# Patient Record
Sex: MALE | Race: WHITE | NOT HISPANIC OR LATINO
[De-identification: names, ages, dates, MRNs, and addresses within clinical notes are randomized per-mention and may not be internally consistent; named-entity substitution may affect disease eponyms.]

---

## 2017-10-11 PROBLEM — Z00.00 ENCOUNTER FOR PREVENTIVE HEALTH EXAMINATION: Status: ACTIVE | Noted: 2017-10-11

## 2017-11-03 ENCOUNTER — APPOINTMENT (OUTPATIENT)
Dept: UROLOGY | Facility: CLINIC | Age: 47
End: 2017-11-03
Payer: MEDICARE

## 2017-11-03 PROCEDURE — 51702 INSERT TEMP BLADDER CATH: CPT

## 2017-11-03 RX ORDER — SERTRALINE HYDROCHLORIDE 50 MG/1
50 TABLET, FILM COATED ORAL
Qty: 30 | Refills: 0 | Status: ACTIVE | COMMUNITY
Start: 2017-07-02

## 2017-11-03 RX ORDER — BETAMETHASONE VALERATE 1 MG/G
0.1 CREAM TOPICAL
Qty: 45 | Refills: 0 | Status: ACTIVE | COMMUNITY
Start: 2017-10-30

## 2017-11-03 RX ORDER — WARFARIN 3 MG/1
3 TABLET ORAL
Qty: 30 | Refills: 0 | Status: ACTIVE | COMMUNITY
Start: 2017-07-14

## 2017-11-03 RX ORDER — WARFARIN 4 MG/1
4 TABLET ORAL
Qty: 30 | Refills: 0 | Status: ACTIVE | COMMUNITY
Start: 2017-07-31

## 2017-11-03 RX ORDER — CLOTRIMAZOLE 10 MG/G
1 CREAM TOPICAL
Qty: 30 | Refills: 0 | Status: ACTIVE | COMMUNITY
Start: 2017-10-30

## 2017-11-03 RX ORDER — CYCLOBENZAPRINE HYDROCHLORIDE 10 MG/1
10 TABLET, FILM COATED ORAL
Qty: 20 | Refills: 0 | Status: ACTIVE | COMMUNITY
Start: 2017-10-25

## 2017-11-03 RX ORDER — MUPIROCIN 20 MG/G
2 OINTMENT TOPICAL
Qty: 22 | Refills: 0 | Status: ACTIVE | COMMUNITY
Start: 2017-10-30

## 2017-12-01 ENCOUNTER — APPOINTMENT (OUTPATIENT)
Dept: UROLOGY | Facility: CLINIC | Age: 47
End: 2017-12-01
Payer: MEDICARE

## 2017-12-01 PROCEDURE — 51702 INSERT TEMP BLADDER CATH: CPT

## 2018-01-05 ENCOUNTER — APPOINTMENT (OUTPATIENT)
Dept: UROLOGY | Facility: CLINIC | Age: 48
End: 2018-01-05

## 2018-01-05 DIAGNOSIS — Z78.9 OTHER SPECIFIED HEALTH STATUS: ICD-10-CM

## 2018-01-05 DIAGNOSIS — Z86.69 PERSONAL HISTORY OF OTHER DISEASES OF THE NERVOUS SYSTEM AND SENSE ORGANS: ICD-10-CM

## 2018-01-19 ENCOUNTER — APPOINTMENT (OUTPATIENT)
Dept: UROLOGY | Facility: CLINIC | Age: 48
End: 2018-01-19
Payer: MEDICARE

## 2018-01-19 VITALS
HEART RATE: 60 BPM | BODY MASS INDEX: 44.1 KG/M2 | SYSTOLIC BLOOD PRESSURE: 154 MMHG | DIASTOLIC BLOOD PRESSURE: 96 MMHG | WEIGHT: 315 LBS | TEMPERATURE: 97.8 F | OXYGEN SATURATION: 90 % | HEIGHT: 71 IN

## 2018-01-19 PROCEDURE — 51702 INSERT TEMP BLADDER CATH: CPT

## 2018-01-19 RX ORDER — SULFAMETHOXAZOLE AND TRIMETHOPRIM 800; 160 MG/1; MG/1
800-160 TABLET ORAL
Qty: 20 | Refills: 0 | Status: DISCONTINUED | COMMUNITY
Start: 2017-10-16 | End: 2018-01-19

## 2018-01-19 RX ORDER — CEFPODOXIME PROXETIL 100 MG/1
100 TABLET, FILM COATED ORAL
Qty: 20 | Refills: 0 | Status: DISCONTINUED | COMMUNITY
Start: 2017-08-18 | End: 2018-01-19

## 2018-02-16 ENCOUNTER — APPOINTMENT (OUTPATIENT)
Dept: UROLOGY | Facility: CLINIC | Age: 48
End: 2018-02-16
Payer: MEDICARE

## 2018-02-16 VITALS
HEART RATE: 78 BPM | WEIGHT: 315 LBS | SYSTOLIC BLOOD PRESSURE: 158 MMHG | TEMPERATURE: 99.1 F | OXYGEN SATURATION: 98 % | DIASTOLIC BLOOD PRESSURE: 114 MMHG | HEIGHT: 71 IN | BODY MASS INDEX: 44.1 KG/M2

## 2018-02-16 PROCEDURE — 51702 INSERT TEMP BLADDER CATH: CPT

## 2018-03-16 ENCOUNTER — APPOINTMENT (OUTPATIENT)
Dept: UROLOGY | Facility: CLINIC | Age: 48
End: 2018-03-16
Payer: MEDICARE

## 2018-03-16 VITALS
SYSTOLIC BLOOD PRESSURE: 158 MMHG | WEIGHT: 315 LBS | HEART RATE: 60 BPM | TEMPERATURE: 98 F | BODY MASS INDEX: 44.1 KG/M2 | HEIGHT: 71 IN | DIASTOLIC BLOOD PRESSURE: 93 MMHG

## 2018-03-16 PROCEDURE — 51702 INSERT TEMP BLADDER CATH: CPT

## 2018-04-20 ENCOUNTER — APPOINTMENT (OUTPATIENT)
Dept: UROLOGY | Facility: CLINIC | Age: 48
End: 2018-04-20
Payer: MEDICARE

## 2018-04-20 VITALS
DIASTOLIC BLOOD PRESSURE: 94 MMHG | TEMPERATURE: 97.9 F | HEART RATE: 68 BPM | HEIGHT: 71 IN | WEIGHT: 315 LBS | BODY MASS INDEX: 44.1 KG/M2 | SYSTOLIC BLOOD PRESSURE: 161 MMHG

## 2018-04-20 PROCEDURE — 51702 INSERT TEMP BLADDER CATH: CPT

## 2018-05-18 ENCOUNTER — APPOINTMENT (OUTPATIENT)
Dept: UROLOGY | Facility: CLINIC | Age: 48
End: 2018-05-18
Payer: MEDICARE

## 2018-05-18 VITALS
HEIGHT: 71 IN | DIASTOLIC BLOOD PRESSURE: 87 MMHG | BODY MASS INDEX: 44.1 KG/M2 | SYSTOLIC BLOOD PRESSURE: 133 MMHG | WEIGHT: 315 LBS | TEMPERATURE: 98 F | HEART RATE: 52 BPM

## 2018-05-18 PROCEDURE — 51703 INSERT BLADDER CATH COMPLEX: CPT

## 2018-06-29 ENCOUNTER — APPOINTMENT (OUTPATIENT)
Dept: UROLOGY | Facility: CLINIC | Age: 48
End: 2018-06-29
Payer: MEDICARE

## 2018-06-29 VITALS
TEMPERATURE: 98.4 F | BODY MASS INDEX: 44.1 KG/M2 | HEIGHT: 71 IN | SYSTOLIC BLOOD PRESSURE: 137 MMHG | WEIGHT: 315 LBS | DIASTOLIC BLOOD PRESSURE: 95 MMHG | HEART RATE: 72 BPM

## 2018-06-29 PROCEDURE — 51702 INSERT TEMP BLADDER CATH: CPT

## 2018-07-23 PROBLEM — Z78.9 ALCOHOL USE: Status: ACTIVE | Noted: 2017-11-03

## 2018-08-03 ENCOUNTER — APPOINTMENT (OUTPATIENT)
Dept: UROLOGY | Facility: CLINIC | Age: 48
End: 2018-08-03
Payer: MEDICARE

## 2018-08-03 VITALS
HEIGHT: 71 IN | DIASTOLIC BLOOD PRESSURE: 90 MMHG | BODY MASS INDEX: 44.1 KG/M2 | SYSTOLIC BLOOD PRESSURE: 143 MMHG | HEART RATE: 61 BPM | TEMPERATURE: 98.6 F | WEIGHT: 315 LBS

## 2018-08-03 PROCEDURE — 51703 INSERT BLADDER CATH COMPLEX: CPT

## 2018-08-31 ENCOUNTER — APPOINTMENT (OUTPATIENT)
Dept: UROLOGY | Facility: CLINIC | Age: 48
End: 2018-08-31

## 2018-09-14 ENCOUNTER — APPOINTMENT (OUTPATIENT)
Dept: UROLOGY | Facility: CLINIC | Age: 48
End: 2018-09-14
Payer: MEDICARE

## 2018-09-14 VITALS
TEMPERATURE: 98.6 F | BODY MASS INDEX: 44.1 KG/M2 | SYSTOLIC BLOOD PRESSURE: 162 MMHG | DIASTOLIC BLOOD PRESSURE: 94 MMHG | WEIGHT: 315 LBS | HEIGHT: 71 IN | HEART RATE: 61 BPM

## 2018-09-14 PROCEDURE — 51702 INSERT TEMP BLADDER CATH: CPT

## 2018-10-19 ENCOUNTER — APPOINTMENT (OUTPATIENT)
Dept: UROLOGY | Facility: CLINIC | Age: 48
End: 2018-10-19
Payer: MEDICARE

## 2018-10-19 VITALS
DIASTOLIC BLOOD PRESSURE: 108 MMHG | SYSTOLIC BLOOD PRESSURE: 171 MMHG | BODY MASS INDEX: 44.1 KG/M2 | WEIGHT: 315 LBS | HEIGHT: 71 IN | HEART RATE: 56 BPM | TEMPERATURE: 98.4 F

## 2018-10-19 PROCEDURE — 51703 INSERT BLADDER CATH COMPLEX: CPT

## 2018-11-16 ENCOUNTER — APPOINTMENT (OUTPATIENT)
Dept: UROLOGY | Facility: CLINIC | Age: 48
End: 2018-11-16
Payer: MEDICARE

## 2018-11-16 VITALS — HEART RATE: 58 BPM | SYSTOLIC BLOOD PRESSURE: 156 MMHG | DIASTOLIC BLOOD PRESSURE: 101 MMHG

## 2018-11-16 PROCEDURE — 51703 INSERT BLADDER CATH COMPLEX: CPT

## 2018-12-14 ENCOUNTER — APPOINTMENT (OUTPATIENT)
Dept: UROLOGY | Facility: CLINIC | Age: 48
End: 2018-12-14
Payer: MEDICARE

## 2018-12-14 PROCEDURE — 51703 INSERT BLADDER CATH COMPLEX: CPT

## 2019-01-11 ENCOUNTER — APPOINTMENT (OUTPATIENT)
Dept: UROLOGY | Facility: CLINIC | Age: 49
End: 2019-01-11
Payer: MEDICARE

## 2019-01-11 VITALS — DIASTOLIC BLOOD PRESSURE: 83 MMHG | SYSTOLIC BLOOD PRESSURE: 142 MMHG | HEART RATE: 76 BPM | RESPIRATION RATE: 16 BRPM

## 2019-01-11 PROCEDURE — 51703 INSERT BLADDER CATH COMPLEX: CPT

## 2019-02-08 ENCOUNTER — APPOINTMENT (OUTPATIENT)
Dept: UROLOGY | Facility: CLINIC | Age: 49
End: 2019-02-08
Payer: MEDICARE

## 2019-02-08 VITALS — DIASTOLIC BLOOD PRESSURE: 95 MMHG | SYSTOLIC BLOOD PRESSURE: 159 MMHG | HEART RATE: 80 BPM | RESPIRATION RATE: 16 BRPM

## 2019-02-08 PROCEDURE — 51703 INSERT BLADDER CATH COMPLEX: CPT

## 2019-03-08 ENCOUNTER — APPOINTMENT (OUTPATIENT)
Dept: UROLOGY | Facility: CLINIC | Age: 49
End: 2019-03-08
Payer: MEDICARE

## 2019-03-22 ENCOUNTER — APPOINTMENT (OUTPATIENT)
Dept: UROLOGY | Facility: CLINIC | Age: 49
End: 2019-03-22
Payer: MEDICARE

## 2019-03-22 VITALS — HEART RATE: 61 BPM | DIASTOLIC BLOOD PRESSURE: 88 MMHG | SYSTOLIC BLOOD PRESSURE: 144 MMHG | RESPIRATION RATE: 15 BRPM

## 2019-03-22 PROCEDURE — 51703 INSERT BLADDER CATH COMPLEX: CPT

## 2019-04-17 ENCOUNTER — APPOINTMENT (OUTPATIENT)
Dept: UROLOGY | Facility: CLINIC | Age: 49
End: 2019-04-17
Payer: MEDICARE

## 2019-04-17 ENCOUNTER — APPOINTMENT (OUTPATIENT)
Dept: UROLOGY | Facility: CLINIC | Age: 49
End: 2019-04-17

## 2019-04-17 VITALS
WEIGHT: 315 LBS | DIASTOLIC BLOOD PRESSURE: 89 MMHG | HEART RATE: 61 BPM | BODY MASS INDEX: 44.1 KG/M2 | SYSTOLIC BLOOD PRESSURE: 142 MMHG | HEIGHT: 71 IN

## 2019-04-17 PROCEDURE — 51702 INSERT TEMP BLADDER CATH: CPT

## 2019-05-24 ENCOUNTER — APPOINTMENT (OUTPATIENT)
Dept: UROLOGY | Facility: CLINIC | Age: 49
End: 2019-05-24
Payer: MEDICARE

## 2019-05-24 PROCEDURE — 51702 INSERT TEMP BLADDER CATH: CPT

## 2019-06-28 ENCOUNTER — APPOINTMENT (OUTPATIENT)
Dept: UROLOGY | Facility: CLINIC | Age: 49
End: 2019-06-28

## 2019-07-11 ENCOUNTER — APPOINTMENT (OUTPATIENT)
Dept: UROLOGY | Facility: CLINIC | Age: 49
End: 2019-07-11

## 2019-07-12 ENCOUNTER — APPOINTMENT (OUTPATIENT)
Dept: UROLOGY | Facility: CLINIC | Age: 49
End: 2019-07-12
Payer: MEDICARE

## 2019-07-12 VITALS — DIASTOLIC BLOOD PRESSURE: 96 MMHG | HEART RATE: 58 BPM | SYSTOLIC BLOOD PRESSURE: 154 MMHG | RESPIRATION RATE: 15 BRPM

## 2019-07-12 PROCEDURE — 51702 INSERT TEMP BLADDER CATH: CPT

## 2019-08-09 ENCOUNTER — APPOINTMENT (OUTPATIENT)
Dept: UROLOGY | Facility: CLINIC | Age: 49
End: 2019-08-09
Payer: MEDICARE

## 2019-08-09 VITALS — SYSTOLIC BLOOD PRESSURE: 127 MMHG | DIASTOLIC BLOOD PRESSURE: 84 MMHG | HEART RATE: 64 BPM | RESPIRATION RATE: 13 BRPM

## 2019-08-09 DIAGNOSIS — N50.9 DISORDER OF MALE GENITAL ORGANS, UNSPECIFIED: ICD-10-CM

## 2019-08-09 PROCEDURE — 51702 INSERT TEMP BLADDER CATH: CPT

## 2019-09-06 ENCOUNTER — APPOINTMENT (OUTPATIENT)
Dept: UROLOGY | Facility: CLINIC | Age: 49
End: 2019-09-06
Payer: MEDICARE

## 2019-09-06 PROCEDURE — 51702 INSERT TEMP BLADDER CATH: CPT

## 2019-09-09 ENCOUNTER — APPOINTMENT (OUTPATIENT)
Dept: UROLOGY | Facility: CLINIC | Age: 49
End: 2019-09-09

## 2019-10-11 ENCOUNTER — APPOINTMENT (OUTPATIENT)
Dept: UROLOGY | Facility: CLINIC | Age: 49
End: 2019-10-11
Payer: MEDICARE

## 2019-10-11 VITALS — SYSTOLIC BLOOD PRESSURE: 157 MMHG | DIASTOLIC BLOOD PRESSURE: 89 MMHG | HEART RATE: 54 BPM

## 2019-10-11 PROCEDURE — 51702 INSERT TEMP BLADDER CATH: CPT

## 2019-11-08 ENCOUNTER — APPOINTMENT (OUTPATIENT)
Dept: UROLOGY | Facility: CLINIC | Age: 49
End: 2019-11-08
Payer: MEDICARE

## 2019-11-08 VITALS — DIASTOLIC BLOOD PRESSURE: 84 MMHG | HEART RATE: 58 BPM | RESPIRATION RATE: 14 BRPM | SYSTOLIC BLOOD PRESSURE: 141 MMHG

## 2019-11-08 PROCEDURE — 51702 INSERT TEMP BLADDER CATH: CPT

## 2019-12-13 ENCOUNTER — APPOINTMENT (OUTPATIENT)
Dept: UROLOGY | Facility: CLINIC | Age: 49
End: 2019-12-13
Payer: MEDICARE

## 2019-12-13 VITALS — SYSTOLIC BLOOD PRESSURE: 123 MMHG | RESPIRATION RATE: 14 BRPM | DIASTOLIC BLOOD PRESSURE: 77 MMHG | HEART RATE: 66 BPM

## 2019-12-13 PROCEDURE — 51702 INSERT TEMP BLADDER CATH: CPT

## 2020-01-24 ENCOUNTER — APPOINTMENT (OUTPATIENT)
Dept: NEUROSURGERY | Facility: CLINIC | Age: 50
End: 2020-01-24
Payer: MEDICARE

## 2020-01-24 VITALS
HEIGHT: 70 IN | WEIGHT: 280 LBS | SYSTOLIC BLOOD PRESSURE: 119 MMHG | DIASTOLIC BLOOD PRESSURE: 70 MMHG | HEART RATE: 79 BPM | BODY MASS INDEX: 40.09 KG/M2

## 2020-01-24 DIAGNOSIS — G82.20 PARAPLEGIA, UNSPECIFIED: ICD-10-CM

## 2020-01-24 DIAGNOSIS — Z98.1 ARTHRODESIS STATUS: ICD-10-CM

## 2020-01-24 PROCEDURE — 99204 OFFICE O/P NEW MOD 45 MIN: CPT

## 2020-01-24 NOTE — REASON FOR VISIT
[New Patient Visit] : a new patient visit [Referred By: _________] : Patient was referred by ERNESTO [FreeTextEntry1] : Leg spasms

## 2020-01-24 NOTE — PHYSICAL EXAM
[General Appearance - Well Developed] : well developed [General Appearance - Well Nourished] : well nourished [General Appearance - Alert] : alert [General Appearance - In No Acute Distress] : in no acute distress [General Appearance - Well-Appearing] : healthy appearing [] : normal voice and communication [Place] : oriented to place [Time] : oriented to time [Person] : oriented to person [Cranial Nerves Oculomotor (III)] : extraocular motion intact [Cranial Nerves Facial (VII)] : face symmetrical [Cranial Nerves Optic (II)] : visual acuity intact bilaterally,  pupils equal round and reactive to light [Cranial Nerves Trigeminal (V)] : facial sensation intact symmetrically [Cranial Nerves Glossopharyngeal (IX)] : tongue and palate midline [Cranial Nerves Vestibulocochlear (VIII)] : hearing was intact bilaterally [Cranial Nerves Accessory (XI - Cranial And Spinal)] : head turning and shoulder shrug symmetric [Cranial Nerves Hypoglossal (XII)] : there was no tongue deviation with protrusion [Motor Tone] : muscle tone was normal in all four extremities [Non-ambulatory] : Non-ambulatory [No Muscle Atrophy] : normal bulk in all four extremities [Motor Handedness Right-Handed] : the patient is right hand dominant [FreeTextEntry1] : + 2+ pitting bilateral lower extremity edema, +chronic vascular skin changes R >L [FreeTextEntry6] : normal upper extremity strength
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
## 2020-02-11 ENCOUNTER — APPOINTMENT (OUTPATIENT)
Dept: UROLOGY | Facility: CLINIC | Age: 50
End: 2020-02-11
Payer: MEDICARE

## 2020-02-11 VITALS — SYSTOLIC BLOOD PRESSURE: 139 MMHG | HEART RATE: 84 BPM | DIASTOLIC BLOOD PRESSURE: 84 MMHG

## 2020-02-11 PROCEDURE — 51702 INSERT TEMP BLADDER CATH: CPT

## 2020-02-24 ENCOUNTER — RX RENEWAL (OUTPATIENT)
Age: 50
End: 2020-02-24

## 2020-03-13 ENCOUNTER — APPOINTMENT (OUTPATIENT)
Dept: UROLOGY | Facility: CLINIC | Age: 50
End: 2020-03-13
Payer: MEDICARE

## 2020-03-13 VITALS — DIASTOLIC BLOOD PRESSURE: 83 MMHG | SYSTOLIC BLOOD PRESSURE: 128 MMHG | HEART RATE: 62 BPM | RESPIRATION RATE: 16 BRPM

## 2020-03-13 PROCEDURE — 51702 INSERT TEMP BLADDER CATH: CPT

## 2020-03-23 ENCOUNTER — RX RENEWAL (OUTPATIENT)
Age: 50
End: 2020-03-23

## 2020-03-24 ENCOUNTER — APPOINTMENT (OUTPATIENT)
Dept: UROLOGY | Facility: CLINIC | Age: 50
End: 2020-03-24
Payer: MEDICARE

## 2020-03-24 VITALS — RESPIRATION RATE: 17 BRPM | SYSTOLIC BLOOD PRESSURE: 156 MMHG | HEART RATE: 99 BPM | DIASTOLIC BLOOD PRESSURE: 119 MMHG

## 2020-03-24 PROCEDURE — 51702 INSERT TEMP BLADDER CATH: CPT

## 2020-04-06 RX ORDER — SULFAMETHOXAZOLE AND TRIMETHOPRIM 800; 160 MG/1; MG/1
800-160 TABLET ORAL TWICE DAILY
Qty: 6 | Refills: 0 | Status: DISCONTINUED | COMMUNITY
Start: 2020-03-05 | End: 2020-04-06

## 2020-04-10 ENCOUNTER — RX RENEWAL (OUTPATIENT)
Age: 50
End: 2020-04-10

## 2020-04-23 ENCOUNTER — APPOINTMENT (OUTPATIENT)
Dept: UROLOGY | Facility: CLINIC | Age: 50
End: 2020-04-23
Payer: MEDICARE

## 2020-04-23 VITALS — RESPIRATION RATE: 17 BRPM | SYSTOLIC BLOOD PRESSURE: 142 MMHG | HEART RATE: 101 BPM | DIASTOLIC BLOOD PRESSURE: 82 MMHG

## 2020-04-23 PROCEDURE — 51702 INSERT TEMP BLADDER CATH: CPT

## 2020-04-24 ENCOUNTER — APPOINTMENT (OUTPATIENT)
Dept: UROLOGY | Facility: CLINIC | Age: 50
End: 2020-04-24

## 2020-05-01 ENCOUNTER — RX RENEWAL (OUTPATIENT)
Age: 50
End: 2020-05-01

## 2020-05-22 ENCOUNTER — RX RENEWAL (OUTPATIENT)
Age: 50
End: 2020-05-22

## 2020-05-29 ENCOUNTER — APPOINTMENT (OUTPATIENT)
Dept: UROLOGY | Facility: CLINIC | Age: 50
End: 2020-05-29
Payer: MEDICARE

## 2020-05-29 VITALS — HEART RATE: 59 BPM | DIASTOLIC BLOOD PRESSURE: 104 MMHG | TEMPERATURE: 97.9 F | SYSTOLIC BLOOD PRESSURE: 162 MMHG

## 2020-05-29 PROCEDURE — 51702 INSERT TEMP BLADDER CATH: CPT

## 2020-05-29 RX ORDER — CEPHALEXIN 500 MG/1
500 CAPSULE ORAL
Qty: 10 | Refills: 0 | Status: DISCONTINUED | COMMUNITY
Start: 2020-04-06 | End: 2020-05-29

## 2020-06-12 ENCOUNTER — RX RENEWAL (OUTPATIENT)
Age: 50
End: 2020-06-12

## 2020-06-30 ENCOUNTER — APPOINTMENT (OUTPATIENT)
Dept: UROLOGY | Facility: CLINIC | Age: 50
End: 2020-06-30
Payer: MEDICARE

## 2020-06-30 VITALS
RESPIRATION RATE: 17 BRPM | SYSTOLIC BLOOD PRESSURE: 126 MMHG | TEMPERATURE: 98.2 F | DIASTOLIC BLOOD PRESSURE: 82 MMHG | HEART RATE: 55 BPM

## 2020-06-30 PROCEDURE — 51702 INSERT TEMP BLADDER CATH: CPT

## 2020-07-24 ENCOUNTER — APPOINTMENT (OUTPATIENT)
Dept: UROLOGY | Facility: CLINIC | Age: 50
End: 2020-07-24
Payer: MEDICARE

## 2020-07-24 VITALS
DIASTOLIC BLOOD PRESSURE: 89 MMHG | RESPIRATION RATE: 16 BRPM | TEMPERATURE: 98.6 F | SYSTOLIC BLOOD PRESSURE: 136 MMHG | HEART RATE: 66 BPM

## 2020-07-24 PROCEDURE — 52000 CYSTOURETHROSCOPY: CPT

## 2020-07-27 ENCOUNTER — RX RENEWAL (OUTPATIENT)
Age: 50
End: 2020-07-27

## 2020-08-24 ENCOUNTER — APPOINTMENT (OUTPATIENT)
Dept: UROLOGY | Facility: CLINIC | Age: 50
End: 2020-08-24
Payer: MEDICARE

## 2020-08-24 VITALS
RESPIRATION RATE: 16 BRPM | TEMPERATURE: 98.3 F | HEART RATE: 96 BPM | SYSTOLIC BLOOD PRESSURE: 140 MMHG | DIASTOLIC BLOOD PRESSURE: 83 MMHG

## 2020-08-24 PROCEDURE — 51702 INSERT TEMP BLADDER CATH: CPT

## 2020-08-28 ENCOUNTER — APPOINTMENT (OUTPATIENT)
Dept: UROLOGY | Facility: CLINIC | Age: 50
End: 2020-08-28

## 2020-09-08 ENCOUNTER — RX RENEWAL (OUTPATIENT)
Age: 50
End: 2020-09-08

## 2020-09-11 ENCOUNTER — APPOINTMENT (OUTPATIENT)
Dept: UROLOGY | Facility: CLINIC | Age: 50
End: 2020-09-11

## 2020-09-25 ENCOUNTER — APPOINTMENT (OUTPATIENT)
Dept: UROLOGY | Facility: CLINIC | Age: 50
End: 2020-09-25

## 2020-11-02 ENCOUNTER — RX RENEWAL (OUTPATIENT)
Age: 50
End: 2020-11-02

## 2020-12-30 ENCOUNTER — RX RENEWAL (OUTPATIENT)
Age: 50
End: 2020-12-30

## 2021-02-19 ENCOUNTER — RX RENEWAL (OUTPATIENT)
Age: 51
End: 2021-02-19

## 2021-02-19 RX ORDER — BACLOFEN 20 MG/1
20 TABLET ORAL 3 TIMES DAILY
Qty: 60 | Refills: 1 | Status: ACTIVE | COMMUNITY
Start: 2020-01-31 | End: 1900-01-01

## 2021-05-28 ENCOUNTER — APPOINTMENT (OUTPATIENT)
Dept: UROLOGY | Facility: CLINIC | Age: 51
End: 2021-05-28

## 2021-09-24 ENCOUNTER — TRANSCRIPTION ENCOUNTER (OUTPATIENT)
Age: 51
End: 2021-09-24

## 2021-12-28 ENCOUNTER — APPOINTMENT (OUTPATIENT)
Dept: UROLOGY | Facility: CLINIC | Age: 51
End: 2021-12-28

## 2022-03-11 ENCOUNTER — APPOINTMENT (OUTPATIENT)
Dept: UROLOGY | Facility: CLINIC | Age: 52
End: 2022-03-11
Payer: MEDICARE

## 2022-03-11 VITALS — DIASTOLIC BLOOD PRESSURE: 87 MMHG | HEART RATE: 69 BPM | SYSTOLIC BLOOD PRESSURE: 145 MMHG

## 2022-03-11 PROCEDURE — 99213 OFFICE O/P EST LOW 20 MIN: CPT
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
## 2022-03-11 NOTE — PHYSICAL EXAM
[General Appearance - Well Nourished] : well nourished [General Appearance - Well Developed] : well developed [Normal Appearance] : normal appearance [Well Groomed] : well groomed [General Appearance - In No Acute Distress] : no acute distress [FreeTextEntry1] : Retana with clear urine and whitish sediments [] : no respiratory distress [Respiration, Rhythm And Depth] : normal respiratory rhythm and effort [Exaggerated Use Of Accessory Muscles For Inspiration] : no accessory muscle use [Oriented To Time, Place, And Person] : oriented to person, place, and time [Affect] : the affect was normal [Mood] : the mood was normal [Not Anxious] : not anxious

## 2022-03-11 NOTE — ASSESSMENT
[FreeTextEntry1] : Cystoscopy previously did not show any bladder stones.  His catheter is being changed monthly or even more frequently by visiting nurse due to becoming clogged.  Urine culture will be sent to see if a short course of antibiotics can be helpful.  He is aware that he will have chronic bacteriuria due to having a chronic Retana catheter.  He knows how to flush the catheter and will continue to remain hydrated.\par \par Francis Clancy MD, FACS\par [facility]\par  of Urology\par \par [address], [address]\[place] [address]\par \par [address], [address]\[place] [address]\par \par Tel: [phone]\par Fax: [phone]

## 2022-03-11 NOTE — LETTER BODY
[Dear  ___] : Dear  [unfilled], [Consult Letter:] : I had the pleasure of evaluating your patient, [unfilled]. [Consult Closing:] : Thank you very much for allowing me to participate in the care of this patient.  If you have any questions, please do not hesitate to contact me. [FreeTextEntry1] : [facility]\par [address] \[place], [place], [address] \par [phone]

## 2022-03-15 LAB — BACTERIA UR CULT: ABNORMAL

## 2022-04-22 ENCOUNTER — APPOINTMENT (OUTPATIENT)
Dept: UROLOGY | Facility: CLINIC | Age: 52
End: 2022-04-22
Payer: MEDICARE

## 2022-04-22 DIAGNOSIS — R33.9 RETENTION OF URINE, UNSPECIFIED: ICD-10-CM

## 2022-04-22 PROCEDURE — 99214 OFFICE O/P EST MOD 30 MIN: CPT

## 2022-04-22 RX ORDER — AMOXICILLIN AND CLAVULANATE POTASSIUM 500; 125 MG/1; MG/1
500-125 TABLET, FILM COATED ORAL
Qty: 10 | Refills: 0 | Status: DISCONTINUED | COMMUNITY
Start: 2022-03-15 | End: 2022-04-22

## 2022-04-22 RX ORDER — METHENAMINE HIPPURATE 1 G/1
1 TABLET ORAL
Qty: 90 | Refills: 3 | Status: ACTIVE | COMMUNITY
Start: 2022-04-22 | End: 1900-01-01

## 2022-04-22 NOTE — PHYSICAL EXAM
[General Appearance - Well Developed] : well developed [General Appearance - Well Nourished] : well nourished [Normal Appearance] : normal appearance [Well Groomed] : well groomed [General Appearance - In No Acute Distress] : no acute distress [FreeTextEntry1] : Retana with clear urine and sediments [] : no respiratory distress [Respiration, Rhythm And Depth] : normal respiratory rhythm and effort [Exaggerated Use Of Accessory Muscles For Inspiration] : no accessory muscle use

## 2022-04-22 NOTE — LETTER BODY
[Dear  ___] : Dear  [unfilled], [Consult Letter:] : I had the pleasure of evaluating your patient, [unfilled]. [Consult Closing:] : Thank you very much for allowing me to participate in the care of this patient.  If you have any questions, please do not hesitate to contact me. [FreeTextEntry1] : [facility]\par [address] \[place], [place], [address] \par [phone]
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
## 2024-12-24 PROBLEM — F10.90 ALCOHOL USE: Status: ACTIVE | Noted: 2017-11-03

## 2025-03-12 ENCOUNTER — APPOINTMENT (OUTPATIENT)
Dept: UROLOGY | Facility: CLINIC | Age: 55
End: 2025-03-12
Payer: MEDICARE

## 2025-03-12 VITALS
BODY MASS INDEX: 33.86 KG/M2 | HEIGHT: 72 IN | DIASTOLIC BLOOD PRESSURE: 116 MMHG | WEIGHT: 250 LBS | HEART RATE: 89 BPM | SYSTOLIC BLOOD PRESSURE: 146 MMHG

## 2025-03-12 DIAGNOSIS — Z97.8 PRESENCE OF OTHER SPECIFIED DEVICES: ICD-10-CM

## 2025-03-12 DIAGNOSIS — N39.0 URINARY TRACT INFECTION, SITE NOT SPECIFIED: ICD-10-CM

## 2025-03-12 DIAGNOSIS — N43.3 HYDROCELE, UNSPECIFIED: ICD-10-CM

## 2025-03-12 DIAGNOSIS — Z87.891 PERSONAL HISTORY OF NICOTINE DEPENDENCE: ICD-10-CM

## 2025-03-12 PROCEDURE — 99203 OFFICE O/P NEW LOW 30 MIN: CPT

## 2025-03-12 RX ORDER — AMOXICILLIN AND CLAVULANATE POTASSIUM 500; 125 MG/1; MG/1
500-125 TABLET, FILM COATED ORAL
Qty: 10 | Refills: 0 | Status: ACTIVE | COMMUNITY
Start: 2025-03-12 | End: 1900-01-01

## 2025-03-31 ENCOUNTER — APPOINTMENT (OUTPATIENT)
Dept: UROLOGY | Facility: CLINIC | Age: 55
End: 2025-03-31

## 2025-03-31 ENCOUNTER — APPOINTMENT (OUTPATIENT)
Dept: UROLOGY | Facility: CLINIC | Age: 55
End: 2025-03-31
Payer: MEDICARE

## 2025-03-31 DIAGNOSIS — R33.9 RETENTION OF URINE, UNSPECIFIED: ICD-10-CM

## 2025-03-31 DIAGNOSIS — Z97.8 PRESENCE OF OTHER SPECIFIED DEVICES: ICD-10-CM

## 2025-03-31 PROCEDURE — 99213 OFFICE O/P EST LOW 20 MIN: CPT

## 2025-04-18 ENCOUNTER — APPOINTMENT (OUTPATIENT)
Dept: UROLOGY | Facility: CLINIC | Age: 55
End: 2025-04-18